# Patient Record
Sex: FEMALE | Race: WHITE | URBAN - METROPOLITAN AREA
[De-identification: names, ages, dates, MRNs, and addresses within clinical notes are randomized per-mention and may not be internally consistent; named-entity substitution may affect disease eponyms.]

---

## 2021-04-12 ENCOUNTER — OFFICE VISIT (OUTPATIENT)
Dept: URBAN - METROPOLITAN AREA CLINIC 30 | Facility: CLINIC | Age: 81
End: 2021-04-12
Payer: MEDICARE

## 2021-04-12 DIAGNOSIS — H25.813 COMBINED FORMS OF AGE-RELATED CATARACT, BILATERAL: Primary | ICD-10-CM

## 2021-04-12 PROCEDURE — 92134 CPTRZ OPH DX IMG PST SGM RTA: CPT | Performed by: OPTOMETRIST

## 2021-04-12 PROCEDURE — 99204 OFFICE O/P NEW MOD 45 MIN: CPT | Performed by: OPTOMETRIST

## 2021-04-12 ASSESSMENT — VISUAL ACUITY
OD: 20/70
OS: 20/50

## 2021-04-12 ASSESSMENT — KERATOMETRY
OS: 42.30
OD: 42.86

## 2021-04-12 ASSESSMENT — INTRAOCULAR PRESSURE
OD: 13
OS: 13

## 2021-04-12 NOTE — IMPRESSION/PLAN
Impression: Combined forms of age-related cataract, bilateral: H25.813. Plan: Discussed cataracts with patient. Discussed treatment options. Surgical treatment is recommended. Surgical risks and benefits discussed. Patient elects surgical treatment. Recommend surgery OU, OD first. Patient is candidate/interested in multifocal IOL, toric lens/astigmatism correction, standard lens, LenSx, ORA. Aim OD: plano. Aim OS: plano.

## 2021-04-12 NOTE — IMPRESSION/PLAN
Impression: Vitreous degeneration, bilateral: H43.813. Plan: Pt educ on findings. Retinas are flat and attached OU. Reviewed s/sx of RD and to call asap if occurs. MAC OCT WNL OU. Patient finds floaters bothersome. Discussed that cataracts may become more noticeable post cataract surgery. PPVIT discussed today.

## 2021-06-07 ENCOUNTER — ADULT PHYSICAL (OUTPATIENT)
Dept: URBAN - METROPOLITAN AREA CLINIC 24 | Facility: CLINIC | Age: 81
End: 2021-06-07
Payer: MEDICARE

## 2021-06-07 DIAGNOSIS — Z01.818 ENCOUNTER FOR OTHER PREPROCEDURAL EXAMINATION: Primary | ICD-10-CM

## 2021-06-07 PROCEDURE — 99203 OFFICE O/P NEW LOW 30 MIN: CPT | Performed by: PHYSICIAN ASSISTANT

## 2021-06-09 ENCOUNTER — OFFICE VISIT (OUTPATIENT)
Dept: URBAN - METROPOLITAN AREA CLINIC 24 | Facility: CLINIC | Age: 81
End: 2021-06-09
Payer: MEDICARE

## 2021-06-09 DIAGNOSIS — H52.203 UNSPECIFIED ASTIGMATISM, BILATERAL: ICD-10-CM

## 2021-06-09 DIAGNOSIS — H52.13 MYOPIA, BILATERAL: ICD-10-CM

## 2021-06-09 DIAGNOSIS — H25.812 COMBINED FORMS OF AGE-RELATED CATARACT, LEFT EYE: ICD-10-CM

## 2021-06-09 PROCEDURE — 92136 OPHTHALMIC BIOMETRY: CPT | Performed by: OPHTHALMOLOGY

## 2021-06-09 PROCEDURE — 99204 OFFICE O/P NEW MOD 45 MIN: CPT | Performed by: OPHTHALMOLOGY

## 2021-06-09 ASSESSMENT — INTRAOCULAR PRESSURE
OS: 11
OD: 12

## 2021-06-09 NOTE — IMPRESSION/PLAN
Impression: Unspecified astigmatism, bilateral: H52.203. Plan: Discussed with patient that LRI will attempt to reduce the astigmatism but will not get rid of all astigmatism and will need glasses after surgery. Discussed with patient, understands this may limit vision after surgery. Also discussed unmasking of astigmatism.

## 2021-06-09 NOTE — IMPRESSION/PLAN
Impression: Vitreous degeneration, bilateral: H43.813. Plan: Discussed signs and symptoms of retinal detachment (flashes,floaters, curtain) as precaution . Patient instructed to call or return to clinic if condition gets worse. Discussed with patient, understands this may limit vision after surgery.

## 2021-06-09 NOTE — IMPRESSION/PLAN
Impression: Myopia, bilateral: H52.13. Plan: Discussed signs and symptoms of retinal detachment (flashes,floaters, curtain) as precaution . Patient instructed to call or return to clinic if condition gets worse.

## 2021-06-09 NOTE — IMPRESSION/PLAN
Impression: Combined forms of age-related cataract, bilateral: H25.813. Plan: 
 Discussed cataract diagnosis with the patient. Risks and benefits of surgical treatment were discussed and understood. Patient desires surgical treatment. Patient is a candidate for [employer]. Patient declines Toric Lens due to cost,  Options discussed with patient. Patient desires to proceed with surgery OU, OD  FIRST. Both eyes examined, medically necessary due to impact in activities of daily living , Discussed loss of myopia  (( AIM -0.25  OU: INJECTABLE OU (DEXYCU 1st choice) ,NO  LENSX: ORA OU: LRI OD @ 104 arc 30: LRI OS @ 160 arc 80 , DENSE, Cortical possible trypan    ))Understands higher risk of complication and delayed healing due to dense cataract. After a long discussion with pt, pt understands that will still need glasses after surgery for best dist and near vision. PLAN: OK to proceed with second cataract surgery after first eye cleared.

## 2021-06-16 ENCOUNTER — SURGERY (OUTPATIENT)
Dept: URBAN - METROPOLITAN AREA SURGERY 12 | Facility: SURGERY | Age: 81
End: 2021-06-16
Payer: MEDICARE

## 2021-06-16 PROCEDURE — 66984 XCAPSL CTRC RMVL W/O ECP: CPT | Performed by: OPHTHALMOLOGY

## 2021-06-17 ENCOUNTER — POST-OPERATIVE VISIT (OUTPATIENT)
Dept: URBAN - METROPOLITAN AREA CLINIC 30 | Facility: CLINIC | Age: 81
End: 2021-06-17
Payer: MEDICARE

## 2021-06-17 DIAGNOSIS — Z48.810 ENCOUNTER FOR SURGICAL AFTERCARE FOLLOWING SURGERY ON A SENSE ORGAN: Primary | ICD-10-CM

## 2021-06-17 PROCEDURE — 99024 POSTOP FOLLOW-UP VISIT: CPT | Performed by: OPTOMETRIST

## 2021-06-17 ASSESSMENT — INTRAOCULAR PRESSURE: OD: 21

## 2021-06-17 NOTE — IMPRESSION/PLAN
Impression: S/P Cataract Extraction by phacoemulsification with IOL placement/ORA/LRI OD - 1 Day. Encounter for surgical aftercare following surgery on a sense organ  Z48.810. Plan: rec ATs, pt is pleased
rtc for 1 week

## 2021-06-23 ENCOUNTER — POST-OPERATIVE VISIT (OUTPATIENT)
Dept: URBAN - METROPOLITAN AREA CLINIC 30 | Facility: CLINIC | Age: 81
End: 2021-06-23
Payer: MEDICARE

## 2021-06-23 PROCEDURE — 99024 POSTOP FOLLOW-UP VISIT: CPT | Performed by: OPTOMETRIST

## 2021-06-23 ASSESSMENT — INTRAOCULAR PRESSURE
OS: 12
OD: 16

## 2021-06-23 ASSESSMENT — VISUAL ACUITY
OS: 20/60
OD: 20/20

## 2021-06-23 ASSESSMENT — KERATOMETRY
OD: 42.38
OS: 42.35

## 2021-06-23 NOTE — IMPRESSION/PLAN
Impression: S/P Cataract Extraction by phacoemulsification with IOL placement/ORA/LRI OD - 7 Days. Encounter for surgical aftercare following surgery on a sense organ  Z48.810.  Plan: pt is pleased with outcome thus far, ok to proceed with 2nd eye

## 2021-06-30 ENCOUNTER — SURGERY (OUTPATIENT)
Dept: URBAN - METROPOLITAN AREA SURGERY 12 | Facility: SURGERY | Age: 81
End: 2021-06-30
Payer: MEDICARE

## 2021-06-30 PROCEDURE — 66984 XCAPSL CTRC RMVL W/O ECP: CPT | Performed by: OPHTHALMOLOGY

## 2021-07-01 ENCOUNTER — POST-OPERATIVE VISIT (OUTPATIENT)
Dept: URBAN - METROPOLITAN AREA CLINIC 30 | Facility: CLINIC | Age: 81
End: 2021-07-01
Payer: MEDICARE

## 2021-07-01 DIAGNOSIS — Z96.1 PRESENCE OF INTRAOCULAR LENS: Primary | ICD-10-CM

## 2021-07-01 PROCEDURE — 99024 POSTOP FOLLOW-UP VISIT: CPT | Performed by: OPTOMETRIST

## 2021-07-01 ASSESSMENT — INTRAOCULAR PRESSURE: OS: 14

## 2021-07-01 NOTE — IMPRESSION/PLAN
Impression: S/P Cataract Extraction by phacoemulsification with IOL placement/ORA/LRI OS - 1 Day. Presence of intraocular lens  Z96.1.  Plan: doing well, rtc for final PO

## 2021-08-03 ENCOUNTER — POST-OPERATIVE VISIT (OUTPATIENT)
Dept: URBAN - METROPOLITAN AREA CLINIC 30 | Facility: CLINIC | Age: 81
End: 2021-08-03
Payer: MEDICARE

## 2021-08-03 PROCEDURE — 99024 POSTOP FOLLOW-UP VISIT: CPT | Performed by: OPTOMETRIST

## 2021-08-03 ASSESSMENT — INTRAOCULAR PRESSURE
OS: 14
OD: 15

## 2021-08-03 ASSESSMENT — KERATOMETRY
OS: 42.22
OD: 42.38

## 2021-08-03 ASSESSMENT — VISUAL ACUITY
OD: 20/20
OS: 20/20

## 2021-08-03 NOTE — IMPRESSION/PLAN
Impression:  Encounter for surgical aftercare following surgery on a sense organ  Z48.810.  Plan: pt very pleased with outcome, uses readers, defers spec rx
6 months CE

## 2022-02-03 ENCOUNTER — OFFICE VISIT (OUTPATIENT)
Dept: URBAN - METROPOLITAN AREA CLINIC 30 | Facility: CLINIC | Age: 82
End: 2022-02-03
Payer: MEDICARE

## 2022-02-03 DIAGNOSIS — H43.813 VITREOUS DEGENERATION, BILATERAL: Primary | ICD-10-CM

## 2022-02-03 DIAGNOSIS — H35.361 DRUSEN (DEGENERATIVE) OF MACULA, RIGHT EYE: ICD-10-CM

## 2022-02-03 DIAGNOSIS — H04.123 TEAR FILM INSUFFICIENCY OF BILATERAL LACRIMAL GLANDS: ICD-10-CM

## 2022-02-03 PROCEDURE — 92014 COMPRE OPH EXAM EST PT 1/>: CPT | Performed by: OPTOMETRIST

## 2022-02-03 PROCEDURE — 92134 CPTRZ OPH DX IMG PST SGM RTA: CPT | Performed by: OPTOMETRIST

## 2022-02-03 ASSESSMENT — VISUAL ACUITY
OD: 20/30
OS: 20/25

## 2022-02-03 ASSESSMENT — KERATOMETRY
OS: 41.77
OD: 42.84

## 2022-02-03 ASSESSMENT — INTRAOCULAR PRESSURE
OD: 13
OS: 14

## 2022-02-03 NOTE — IMPRESSION/PLAN
Impression: Drusen (degenerative) of macula, right eye: H35.361. Plan: Few hard drusen. Discussed as precursor to AMD. Denies fam Hx of AMD. Pt smoked for 42 years. Quit in 1998. Discussed lutein/zeaxanthin supplement. Pt notes she has IBS.

## 2022-02-03 NOTE — IMPRESSION/PLAN
Impression: Vitreous degeneration, bilateral: H43.813. Plan: Rarely notices floaters anymore. Retinas are flat and attached OU. Reviewed s/sx of RD and to call asap if occurs. MAC OCT WNL OU. Patient finds floaters bothersome. MAC OCT WNL OU.

## 2022-02-03 NOTE — IMPRESSION/PLAN
Impression: Tear film insufficiency of bilateral lacrimal glands: H04.123. Plan: Rarely using ATs, 1-2x/month, noting overall comfort.

## 2023-03-24 ENCOUNTER — OFFICE VISIT (OUTPATIENT)
Dept: URBAN - METROPOLITAN AREA CLINIC 24 | Facility: CLINIC | Age: 83
End: 2023-03-24
Payer: MEDICARE

## 2023-03-24 DIAGNOSIS — Z96.1 PRESENCE OF PSEUDOPHAKIA: ICD-10-CM

## 2023-03-24 DIAGNOSIS — H43.813 VITREOUS DEGENERATION, BILATERAL: ICD-10-CM

## 2023-03-24 DIAGNOSIS — H35.361 DRUSEN (DEGENERATIVE) OF MACULA, RIGHT EYE: Primary | ICD-10-CM

## 2023-03-24 PROCEDURE — 92014 COMPRE OPH EXAM EST PT 1/>: CPT | Performed by: OPTOMETRIST

## 2023-03-24 ASSESSMENT — INTRAOCULAR PRESSURE
OD: 15
OS: 13

## 2023-03-24 ASSESSMENT — VISUAL ACUITY
OS: 20/25
OD: 20/25

## 2023-03-24 NOTE — IMPRESSION/PLAN
Impression: Drusen (degenerative) of macula, right eye: H35.361. Plan: Visually inconsequential. Observation only.